# Patient Record
Sex: FEMALE | Race: ASIAN | NOT HISPANIC OR LATINO | URBAN - METROPOLITAN AREA
[De-identification: names, ages, dates, MRNs, and addresses within clinical notes are randomized per-mention and may not be internally consistent; named-entity substitution may affect disease eponyms.]

---

## 2021-01-14 ENCOUNTER — APPOINTMENT (RX ONLY)
Dept: URBAN - METROPOLITAN AREA CLINIC 44 | Facility: CLINIC | Age: 66
Setting detail: DERMATOLOGY
End: 2021-01-14

## 2021-01-14 ENCOUNTER — APPOINTMENT (RX ONLY)
Dept: URBAN - METROPOLITAN AREA CLINIC 45 | Facility: CLINIC | Age: 66
Setting detail: DERMATOLOGY
End: 2021-01-14

## 2021-01-14 DIAGNOSIS — L30.9 DERMATITIS, UNSPECIFIED: ICD-10-CM

## 2021-01-14 PROCEDURE — ? PRESCRIPTION

## 2021-01-14 PROCEDURE — ? COUNSELING

## 2021-01-14 PROCEDURE — ? ADDITIONAL NOTES

## 2021-01-14 PROCEDURE — 99204 OFFICE O/P NEW MOD 45 MIN: CPT

## 2021-01-14 RX ORDER — FLUOCINONIDE 0.5 MG/G
1 CREAM TOPICAL BID
Qty: 1 | Refills: 1 | Status: ERX | COMMUNITY
Start: 2021-01-14

## 2021-01-14 RX ORDER — LEVOCETIRIZINE DIHYDROCHLORIDE 5 MG
1 TABLET ORAL QD
Qty: 30 | Refills: 0 | Status: ERX | COMMUNITY
Start: 2021-01-14

## 2021-01-14 RX ADMIN — Medication 1: at 00:00

## 2021-01-14 RX ADMIN — FLUOCINONIDE 1: 0.5 CREAM TOPICAL at 00:00

## 2021-01-14 ASSESSMENT — LOCATION DETAILED DESCRIPTION DERM
LOCATION DETAILED: RIGHT VENTRAL DISTAL FOREARM
LOCATION DETAILED: RIGHT INFERIOR ANTERIOR NECK
LOCATION DETAILED: LEFT PROXIMAL PRETIBIAL REGION
LOCATION DETAILED: RIGHT PROXIMAL PRETIBIAL REGION
LOCATION DETAILED: LEFT PROXIMAL PRETIBIAL REGION
LOCATION DETAILED: LEFT VENTRAL DISTAL FOREARM
LOCATION DETAILED: RIGHT PROXIMAL PRETIBIAL REGION
LOCATION DETAILED: LEFT VENTRAL DISTAL FOREARM
LOCATION DETAILED: RIGHT VENTRAL DISTAL FOREARM
LOCATION DETAILED: RIGHT INFERIOR ANTERIOR NECK

## 2021-01-14 ASSESSMENT — LOCATION SIMPLE DESCRIPTION DERM
LOCATION SIMPLE: LEFT PRETIBIAL REGION
LOCATION SIMPLE: RIGHT PRETIBIAL REGION
LOCATION SIMPLE: RIGHT ANTERIOR NECK
LOCATION SIMPLE: RIGHT PRETIBIAL REGION
LOCATION SIMPLE: RIGHT FOREARM
LOCATION SIMPLE: LEFT PRETIBIAL REGION
LOCATION SIMPLE: RIGHT FOREARM
LOCATION SIMPLE: LEFT FOREARM
LOCATION SIMPLE: LEFT FOREARM
LOCATION SIMPLE: RIGHT ANTERIOR NECK

## 2021-01-14 ASSESSMENT — SEVERITY ASSESSMENT
SEVERITY: ALMOST CLEAR
SEVERITY: ALMOST CLEAR

## 2021-01-14 ASSESSMENT — LOCATION ZONE DERM
LOCATION ZONE: LEG
LOCATION ZONE: NECK
LOCATION ZONE: ARM
LOCATION ZONE: LEG
LOCATION ZONE: ARM
LOCATION ZONE: NECK

## 2021-01-14 NOTE — PROCEDURE: ADDITIONAL NOTES
Additional Notes: New transient erythematous pruritic eruption for past 2-3 weeks.  Has come and gone several times.  Photos of feet and legs looked like urticaria. (Patient had photos on her cell phone.)  She has never had anything like this before.  Reports 1 episode of lip (but not tongue) swelling; denies SOB or difficulty swallowing or speaking.  She thinks this may have been triggered by  DAYQUIL she was taking.  She denies recent fever, but reports cough (now better) and some upset stomach.  \\n\\nMy working diagnosis is acute urticaria.  I discussed that this may be due to infection, medication, foods/allergies, or idiopathic causes.  If it becomes chronic autoimmune and physical causes are possible.\\n\\nStart Fluocinonide cream - Apply to affected areas twice daily for 2 weeks per month as needed\\n\\nStart Xyzal 5% - Take 1 pill by mouth once daily in mornings.\\nStart OTC Benadryl pill once daily at night \\n\\nI suggest she stops the DAYQUIL and get tested for COVID-19.\\n\\nRTC 4 weeks (at which point it will be \"chronic\" in nature).  May consider workup at that time.\\n\\n\\n

## 2021-01-14 NOTE — PROCEDURE: MIPS QUALITY
Quality 110: Preventive Care And Screening: Influenza Immunization: Influenza Immunization not Administered for Documented Reasons.
Detail Level: Detailed
Additional Notes: Personal

## 2021-01-14 NOTE — PROCEDURE: ADDITIONAL NOTES
Render Risk Assessment In Note?: no
Additional Notes: New transient erythematous pruritic eruption for past 2-3 weeks.  Has come and gone several times.  Photos of feet and legs looked like urticaria. (Patient had photos on her cell phone.)  She has never had anything like this before.  Reports 1 episode of lip (but not tongue) swelling; denies SOB or difficulty swallowing or speaking.  She thinks this may have been triggered by  DAYQUIL she was taking.  She denies recent fever, but reports cough (now better) and some upset stomach.  \\n\\nMy working diagnosis is acute urticaria.  I discussed that this may be due to infection, medication, foods/allergies, or idiopathic causes.  If it becomes chronic autoimmune and physical causes are possible.\\n\\nStart Fluocinonide cream - Apply to affected areas twice daily for 2 weeks per month as needed\\n\\nStart Xyzal 5% - Take 1 pill by mouth once daily in mornings.\\nStart OTC Benadryl pill once daily at night \\n\\nI suggest she stops the DAYQUIL and get tested for COVID-19.\\n\\nRTC 4 weeks (at which point it will be \"chronic\" in nature).  May consider workup at that time.\\n\\n\\n
Detail Level: Detailed

## 2021-01-14 NOTE — PROCEDURE: MIPS QUALITY
Detail Level: Detailed
Quality 110: Preventive Care And Screening: Influenza Immunization: Influenza Immunization not Administered for Documented Reasons.
Additional Notes: Personal

## 2021-01-14 NOTE — HPI: RASH
What Type Of Note Output Would You Prefer (Optional)?: Standard Output
Is The Patient Presenting As Previously Scheduled?: Yes
How Severe Is Your Rash?: mild
Is This A New Presentation, Or A Follow-Up?: Rash
Additional History: Patient had some Fluocinonide cream 0.05% at home and has been using it on her rash.

## 2021-02-12 ENCOUNTER — APPOINTMENT (RX ONLY)
Dept: URBAN - METROPOLITAN AREA CLINIC 44 | Facility: CLINIC | Age: 66
Setting detail: DERMATOLOGY
End: 2021-02-12

## 2021-02-12 ENCOUNTER — RX ONLY (OUTPATIENT)
Age: 66
Setting detail: RX ONLY
End: 2021-02-12

## 2021-02-12 ENCOUNTER — APPOINTMENT (RX ONLY)
Dept: URBAN - METROPOLITAN AREA CLINIC 45 | Facility: CLINIC | Age: 66
Setting detail: DERMATOLOGY
End: 2021-02-12

## 2021-02-12 DIAGNOSIS — L50.1 IDIOPATHIC URTICARIA: ICD-10-CM | Status: INADEQUATELY CONTROLLED

## 2021-02-12 PROCEDURE — ? ADDITIONAL NOTES

## 2021-02-12 PROCEDURE — 99214 OFFICE O/P EST MOD 30 MIN: CPT

## 2021-02-12 PROCEDURE — ? COUNSELING

## 2021-02-12 RX ORDER — FLUOCINONIDE 0.5 MG/G
1 CREAM TOPICAL BID
Qty: 1 | Refills: 1 | Status: ERX

## 2021-02-12 NOTE — PROCEDURE: ADDITIONAL NOTES
Patient Management Risk Assessment: Moderate
Render Risk Assessment In Note?: no
Additional Notes: New transient erythematous pruritic eruption that started about 7 weeks ago.  Has come and gone several times.  Most recently it was clear 3 weeks then flared up again last night.  She notes alcohol and heat may make it flare up.\\n\\nHPI:  Photos of feet and legs looked like urticaria. (Patient had photos on her cell phone.)  She has never had anything like this before.  Reports 1 episode of lip (but not tongue) swelling; denies SOB or difficulty swallowing or speaking.  She thinks this may have been triggered by  DAYQUIL she was taking.  She denies recent fever, but reports cough (now better) and some upset stomach.  \\n\\nMy working diagnosis is still urticaria; it appears it could be transitioning from acute to chronic time-course.  I hope that she is experiencing \"echoes\" of the original rash and hopefully it will dissipate over time.  I discussed that this may be due to infection, medication, foods/allergies, or idiopathic causes.  If it becomes chronic autoimmune and physical causes are possible.\\n\\nCont Fluocinonide cream - Apply to affected areas twice daily for 2 weeks per month as needed\\n\\nCont Xyzal 5% - Take 1 pill by mouth once daily in mornings.\\nCont OTC Benadryl pill once daily at night \\n\\nUpToDate patient handout (Beyond the Basics) given to patient.\\n\\nRTC 2 months; if this doesn’t go away upon return will do bloodwork.
Detail Level: Detailed

## 2021-02-12 NOTE — PROCEDURE: ADDITIONAL NOTES
Render Risk Assessment In Note?: no
Patient Management Risk Assessment: Moderate
Detail Level: Detailed
Additional Notes: New transient erythematous pruritic eruption that started about 7 weeks ago.  Has come and gone several times.  Most recently it was clear 3 weeks then flared up again last night.  She notes alcohol and heat may make it flare up.\\n\\nHPI:  Photos of feet and legs looked like urticaria. (Patient had photos on her cell phone.)  She has never had anything like this before.  Reports 1 episode of lip (but not tongue) swelling; denies SOB or difficulty swallowing or speaking.  She thinks this may have been triggered by  DAYQUIL she was taking.  She denies recent fever, but reports cough (now better) and some upset stomach.  \\n\\nMy working diagnosis is still urticaria; it appears it could be transitioning from acute to chronic time-course.  I hope that she is experiencing \"echoes\" of the original rash and hopefully it will dissipate over time.  I discussed that this may be due to infection, medication, foods/allergies, or idiopathic causes.  If it becomes chronic autoimmune and physical causes are possible.\\n\\nCont Fluocinonide cream - Apply to affected areas twice daily for 2 weeks per month as needed\\n\\nCont Xyzal 5% - Take 1 pill by mouth once daily in mornings.\\nCont OTC Benadryl pill once daily at night \\n\\nUpToDate patient handout (Beyond the Basics) given to patient.\\n\\nRTC 2 months; if this doesn’t go away upon return will do bloodwork.

## 2021-02-12 NOTE — HPI: RASH
What Type Of Note Output Would You Prefer (Optional)?: Bullet Format
Is The Patient Presenting As Previously Scheduled?: Yes
How Severe Is Your Rash?: mild
Is This A New Presentation, Or A Follow-Up?: Follow Up Rash
Additional History: Patient stated that the hives went away for 3 weeks but returned last night.